# Patient Record
Sex: MALE | URBAN - METROPOLITAN AREA
[De-identification: names, ages, dates, MRNs, and addresses within clinical notes are randomized per-mention and may not be internally consistent; named-entity substitution may affect disease eponyms.]

---

## 2021-01-20 ENCOUNTER — OFFICE VISIT (OUTPATIENT)
Dept: URBAN - METROPOLITAN AREA CLINIC 11 | Facility: CLINIC | Age: 51
End: 2021-01-20
Payer: MEDICAID

## 2021-01-20 PROCEDURE — 92002 INTRM OPH EXAM NEW PATIENT: CPT | Performed by: OPTOMETRIST

## 2021-01-20 ASSESSMENT — INTRAOCULAR PRESSURE
OD: 19
OS: 18

## 2021-03-25 ENCOUNTER — OFFICE VISIT (OUTPATIENT)
Dept: URBAN - METROPOLITAN AREA CLINIC 11 | Facility: CLINIC | Age: 51
End: 2021-03-25
Payer: MEDICAID

## 2021-03-25 DIAGNOSIS — H00.15 CHALAZION LEFT LOWER EYELID: Primary | Chronic | ICD-10-CM

## 2021-03-25 PROCEDURE — 99203 OFFICE O/P NEW LOW 30 MIN: CPT | Performed by: OPHTHALMOLOGY

## 2021-03-25 ASSESSMENT — INTRAOCULAR PRESSURE
OD: 18
OS: 18

## 2021-03-25 NOTE — IMPRESSION/PLAN
Impression: Chalazion left lower eyelid: H00.15. Left. Condition: established, stable. Symptoms: may improve with surgery. Vision: vision threatening. Plan: Discussed diagnosis in detail with patient. Discussed treatment options with patient. Surgical risks and benefits were discussed, explained and understood by patient. Patient elects to have surgery. Recommend Incision and Drainage of Left Lower Eyelid. Obtain insurance verification and schedule minor office surgery.

## 2021-04-08 ENCOUNTER — PROCEDURE (OUTPATIENT)
Dept: URBAN - METROPOLITAN AREA CLINIC 11 | Facility: CLINIC | Age: 51
End: 2021-04-08
Payer: MEDICAID

## 2021-04-08 PROCEDURE — 67800 REMOVE EYELID LESION: CPT | Performed by: OPHTHALMOLOGY
